# Patient Record
Sex: MALE | Race: WHITE | URBAN - METROPOLITAN AREA
[De-identification: names, ages, dates, MRNs, and addresses within clinical notes are randomized per-mention and may not be internally consistent; named-entity substitution may affect disease eponyms.]

---

## 2022-06-01 ENCOUNTER — OFFICE VISIT (OUTPATIENT)
Dept: URBAN - METROPOLITAN AREA CLINIC 44 | Facility: CLINIC | Age: 62
End: 2022-06-01
Payer: COMMERCIAL

## 2022-06-01 DIAGNOSIS — H25.13 AGE-RELATED NUCLEAR CATARACT, BILATERAL: ICD-10-CM

## 2022-06-01 DIAGNOSIS — H04.123 TEAR FILM INSUFFICIENCY OF BILATERAL LACRIMAL GLANDS: Primary | ICD-10-CM

## 2022-06-01 DIAGNOSIS — H52.4 PRESBYOPIA: ICD-10-CM

## 2022-06-01 DIAGNOSIS — H43.812 VITREOUS DEGENERATION, LEFT EYE: ICD-10-CM

## 2022-06-01 PROCEDURE — 92004 COMPRE OPH EXAM NEW PT 1/>: CPT | Performed by: OPTOMETRIST

## 2022-06-01 ASSESSMENT — VISUAL ACUITY
OS: 20/20
OD: 20/20

## 2022-06-01 ASSESSMENT — INTRAOCULAR PRESSURE
OD: 11
OS: 10

## 2022-06-01 ASSESSMENT — KERATOMETRY
OS: 44.00
OD: 44.38

## 2022-06-01 NOTE — IMPRESSION/PLAN
Impression: Tear film insufficiency of bilateral lacrimal glands: H04.123. Plan: No foreign body or epi defect observed. Instantaneous TBUT OU. Discontinue Visine. Use Systane Complete q2h. RTC if symptoms do not improve.